# Patient Record
Sex: FEMALE | Race: WHITE | Employment: OTHER | URBAN - METROPOLITAN AREA
[De-identification: names, ages, dates, MRNs, and addresses within clinical notes are randomized per-mention and may not be internally consistent; named-entity substitution may affect disease eponyms.]

---

## 2020-02-12 ENCOUNTER — HOSPITAL ENCOUNTER (EMERGENCY)
Facility: CLINIC | Age: 75
Discharge: HOME | End: 2020-02-12
Attending: EMERGENCY MEDICINE | Admitting: EMERGENCY MEDICINE
Payer: COMMERCIAL

## 2020-02-12 VITALS
RESPIRATION RATE: 16 BRPM | OXYGEN SATURATION: 96 % | SYSTOLIC BLOOD PRESSURE: 153 MMHG | HEIGHT: 67 IN | DIASTOLIC BLOOD PRESSURE: 74 MMHG | WEIGHT: 180 LBS | TEMPERATURE: 97.6 F | BODY MASS INDEX: 28.25 KG/M2

## 2020-02-12 DIAGNOSIS — R55 PRE-SYNCOPE: Primary | ICD-10-CM

## 2020-02-12 DIAGNOSIS — R42 DIZZINESS: ICD-10-CM

## 2020-02-12 DIAGNOSIS — R42 LIGHTHEADEDNESS: ICD-10-CM

## 2020-02-12 LAB
ALBUMIN UR-MCNC: NEGATIVE MG/DL
ANION GAP SERPL CALCULATED.3IONS-SCNC: 4 MMOL/L (ref 3–14)
APPEARANCE UR: CLEAR
BACTERIA #/AREA URNS HPF: ABNORMAL /HPF
BASOPHILS # BLD AUTO: 0 10E9/L (ref 0–0.2)
BASOPHILS NFR BLD AUTO: 0.2 %
BILIRUB UR QL STRIP: NEGATIVE
BUN SERPL-MCNC: 16 MG/DL (ref 7–30)
CALCIUM SERPL-MCNC: 9.9 MG/DL (ref 8.5–10.1)
CHLORIDE SERPL-SCNC: 108 MMOL/L (ref 94–109)
CO2 SERPL-SCNC: 28 MMOL/L (ref 20–32)
COLOR UR AUTO: ABNORMAL
CREAT SERPL-MCNC: 0.83 MG/DL (ref 0.52–1.04)
DIFFERENTIAL METHOD BLD: ABNORMAL
EOSINOPHIL # BLD AUTO: 0.1 10E9/L (ref 0–0.7)
EOSINOPHIL NFR BLD AUTO: 0.8 %
ERYTHROCYTE [DISTWIDTH] IN BLOOD BY AUTOMATED COUNT: 12.7 % (ref 10–15)
GFR SERPL CREATININE-BSD FRML MDRD: 70 ML/MIN/{1.73_M2}
GLUCOSE SERPL-MCNC: 116 MG/DL (ref 70–99)
GLUCOSE UR STRIP-MCNC: NEGATIVE MG/DL
HCT VFR BLD AUTO: 47.9 % (ref 35–47)
HGB BLD-MCNC: 16.1 G/DL (ref 11.7–15.7)
HGB UR QL STRIP: NEGATIVE
IMM GRANULOCYTES # BLD: 0 10E9/L (ref 0–0.4)
IMM GRANULOCYTES NFR BLD: 0.2 %
INTERPRETATION ECG - MUSE: NORMAL
KETONES UR STRIP-MCNC: NEGATIVE MG/DL
LEUKOCYTE ESTERASE UR QL STRIP: NEGATIVE
LYMPHOCYTES # BLD AUTO: 1.6 10E9/L (ref 0.8–5.3)
LYMPHOCYTES NFR BLD AUTO: 18.6 %
MCH RBC QN AUTO: 30.4 PG (ref 26.5–33)
MCHC RBC AUTO-ENTMCNC: 33.6 G/DL (ref 31.5–36.5)
MCV RBC AUTO: 91 FL (ref 78–100)
MONOCYTES # BLD AUTO: 0.6 10E9/L (ref 0–1.3)
MONOCYTES NFR BLD AUTO: 6.4 %
NEUTROPHILS # BLD AUTO: 6.3 10E9/L (ref 1.6–8.3)
NEUTROPHILS NFR BLD AUTO: 73.8 %
NITRATE UR QL: NEGATIVE
PH UR STRIP: 5.5 PH (ref 5–7)
PLATELET # BLD AUTO: 230 10E9/L (ref 150–450)
POTASSIUM SERPL-SCNC: 3.9 MMOL/L (ref 3.4–5.3)
RBC # BLD AUTO: 5.29 10E12/L (ref 3.8–5.2)
RBC #/AREA URNS AUTO: <1 /HPF (ref 0–2)
SODIUM SERPL-SCNC: 140 MMOL/L (ref 133–144)
SOURCE: ABNORMAL
SP GR UR STRIP: 1 (ref 1–1.03)
SQUAMOUS #/AREA URNS AUTO: <1 /HPF (ref 0–1)
TROPONIN I SERPL-MCNC: 0.02 UG/L (ref 0–0.04)
TSH SERPL DL<=0.005 MIU/L-ACNC: 0.98 MU/L (ref 0.4–4)
UROBILINOGEN UR STRIP-MCNC: NORMAL MG/DL (ref 0–2)
WBC # BLD AUTO: 8.6 10E9/L (ref 4–11)
WBC #/AREA URNS AUTO: 2 /HPF (ref 0–5)

## 2020-02-12 PROCEDURE — 99284 EMERGENCY DEPT VISIT MOD MDM: CPT | Mod: 25

## 2020-02-12 PROCEDURE — 25800030 ZZH RX IP 258 OP 636: Performed by: EMERGENCY MEDICINE

## 2020-02-12 PROCEDURE — 93005 ELECTROCARDIOGRAM TRACING: CPT

## 2020-02-12 PROCEDURE — 84443 ASSAY THYROID STIM HORMONE: CPT | Performed by: EMERGENCY MEDICINE

## 2020-02-12 PROCEDURE — 96360 HYDRATION IV INFUSION INIT: CPT

## 2020-02-12 PROCEDURE — 85025 COMPLETE CBC W/AUTO DIFF WBC: CPT | Performed by: EMERGENCY MEDICINE

## 2020-02-12 PROCEDURE — 80048 BASIC METABOLIC PNL TOTAL CA: CPT | Performed by: EMERGENCY MEDICINE

## 2020-02-12 PROCEDURE — 84484 ASSAY OF TROPONIN QUANT: CPT | Performed by: EMERGENCY MEDICINE

## 2020-02-12 PROCEDURE — 81001 URINALYSIS AUTO W/SCOPE: CPT | Performed by: EMERGENCY MEDICINE

## 2020-02-12 RX ADMIN — SODIUM CHLORIDE 1000 ML: 9 INJECTION, SOLUTION INTRAVENOUS at 16:49

## 2020-02-12 ASSESSMENT — ENCOUNTER SYMPTOMS
NUMBNESS: 0
VOMITING: 1
DIZZINESS: 0
DYSURIA: 1
FREQUENCY: 1
LIGHT-HEADEDNESS: 1
WEAKNESS: 0
DIAPHORESIS: 1
BLOOD IN STOOL: 0
SHORTNESS OF BREATH: 0

## 2020-02-12 ASSESSMENT — MIFFLIN-ST. JEOR: SCORE: 1354.1

## 2020-02-12 NOTE — ED PROVIDER NOTES
History     Chief Complaint:    Dizziness    The history is provided by the patient.      Judi Belcher is a 74 year old female with a history of hyperlipidemia and sepsis who presents with dizziness. Today the patient was shopping at [employer] when she became diaphoretic and dizzy. The patient needed to sit down and texted a family member for help. She did not experience chest pain or shortness of breath before her symptoms began. She reports feeling faint and lightheaded, but did not lose consciousness and does not feel like the room is spinning. She also endorses dysuria and increased urinary frequency that began today. She had 1 episode of emesis while in the ED. Currently she denies dizziness. The patient denies bloody stools, vision changes, or extremity numbness or weakness. This has never happened to her before. The patient does not have a personal history of strokes, myocardial infarctions, or cardiac stent placements.     Allergies:  No known drug allergies    Medications:    Simvastatin  Aspirin  Calcium carbonate    Past Medical History:    Hyperlipidemia  Sepsis due to group A streptococcus  GRETA  Allergic rhinitis  Tubular adenoma  Complex endometrial hyperplasia    Past Surgical History:    Hysterectomy  Bilateral cataract removal    Family History:    Heart disease - mother, father    Social History:  Smoking status: never smoker  Alcohol use: yes  Drug use: no  The patient presents to the emergency department with her nephew's wife.  PCP: [facility]  Marital Status:       Review of Systems   Constitutional: Positive for diaphoresis.   Eyes: Negative for visual disturbance.   Respiratory: Negative for shortness of breath.    Cardiovascular: Negative for chest pain.   Gastrointestinal: Positive for vomiting. Negative for blood in stool.   Genitourinary: Positive for dysuria and frequency.   Neurological: Positive for light-headedness. Negative for dizziness (earlier,  "none currently), syncope, weakness and numbness.   All other systems reviewed and are negative.      Physical Exam     Patient Vitals for the past 24 hrs:   BP Temp Heart Rate Resp SpO2 Height Weight   02/12/20 1434 (!) 153/74 97.6  F (36.4  C) 64 16 96 % 1.702 m (5' 7\") 81.6 kg (180 lb)     Physical Exam  General: Alert, appears well-developed and well-nourished. Cooperative.     In no acute distress  HEENT:  Head:  Atraumatic  Ears:  External ears are normal  Mouth/Throat:  Oropharynx is without erythema or exudate and mucous membranes are moist.   Eyes:   Conjunctivae normal and EOM are normal. No scleral icterus.    Pupils are equal, round, and reactive to light.   CV:  Normal rate, regular rhythm, normal heart sounds and radial pulses are 2+ and symmetric.  No murmur.  Resp:  Breath sounds are clear bilaterally    Non-labored, no retractions or accessory muscle use  GI:  Abdomen is soft, no distension, no tenderness. No rebound or guarding.  No CVA tenderness bilaterally  MS:  Normal range of motion. No edema.    Normal strength in all 4 extremities.     Back atraumatic.    No midline cervical, thoracic, or lumbar tenderness  Skin:  Warm and dry.  No rash or lesions noted.  Neuro: Alert. Normal strength.  Sensation intact in all 4 extremities. GCS: 15  Psych:  Normal mood and affect.    Emergency Department Course     ECG (14:44:11):  Rate 66 bpm. PA interval 168. QRS duration 98. QT/QTc 404/423. P-R-T axes 58 -57 80. Sinus rhythm with premature atrial complexes in a pattern of bigeminy. Left axis deviation. Cannot rule out Anterior infarct, age undetermined. Abnormal ECG. No significant change compared to EKG dated 9/28/16. Interpreted at 1748 by Lew Osorio MD.    Laboratory:  Laboratory findings were communicated with the patient and family who voiced understanding of the findings.    CBC: HGB 16.1 H o/w WNL (WBC 8.6, )  BMP: Glucose 116 H o/w WNL (Creatinine 0.83)  Troponin I (1738): 0.024  TSH " with free T4 reflex: 0.98    UA: clear, light yellow urine, bacteria few o/w negative    Interventions:  1649: 0.9% sodium chloride BOLUS 1 L IV    Emergency Department Course:  Past medical records, nursing notes, and vitals reviewed.    1639: I performed an exam of the patient as documented above.     EKG obtained in the ED, see results above.     IV was inserted and blood was drawn for laboratory testing, results above.    The patient provided a urine sample here in the emergency department. This was sent for laboratory testing, findings above.    1749: I rechecked the patient and discussed the results of her workup thus far.     I personally reviewed the laboratory and EKG results with the Patient and nephew's wife and answered all related questions prior to discharge.     Findings and plan explained to the Patient and nephew's wife. Patient discharged home with instructions regarding supportive care, medications, and reasons to return. The importance of close follow-up was reviewed.    Impression & Plan     Medical Decision Making:  Judi Belcher is a 74 year old female who presents for evaluation of near-syncope and transient dizziness.  They definitely did not have actual syncope.  The differential for near-syncope is broad and includes etiologies such as cardiac arrhythmia, ACS, aortic stenosis, HOCM, PE, orthostatic hypotension, drugs, situational, carotid hypersensitivity, seizure, TIA, stroke, vasovagal.  There are no signs of a concerning etiology for near- syncope at this point.  In addition, there is no family history of sudden death, no chest pain, no seizure activity or post-ictal period, no murmur, and no signs of orthostasis in the ED, no focal neurologic symptoms, and no complaints of concerning headache.  She is not dizzy here in the ED and it seems this lightheadedness/dizziness was transient and unlikely related to CVA, vertigo or other common forms of dizzy spells.  The workup in the ED is  negative and the physical exam is re-assuring.  Supportive outpatient management is therefore indicated.    Diagnosis:    ICD-10-CM   1. Pre-syncope R55   2. Dizziness R42   3. Lightheadedness R42     Disposition:  Discharged to home.    Discharge Medications:  Discharge Medication List as of 2/12/2020  5:52 PM        Scribe Disclosure:  I, Kenna Rubio, am serving as a scribe at 4:42 PM on 2/12/2020 to document services personally performed by Lew Osorio MD based on my observations and the provider's statements to me.     Kenna Rubio  2/12/2020    EMERGENCY DEPARTMENT       eLw Osorio MD  02/13/20 112

## 2020-02-12 NOTE — ED AVS SNAPSHOT
Emergency Department  [address] [facility] [address]  Phone:  [phone]  Fax:  [phone]                                    Judi Belcher   MRN: [id number]    Department:   Emergency Department   Date of Visit:  2/12/2020           After Visit Summary Signature Page    I have received my discharge instructions, and my questions have been answered. I have discussed any challenges I see with this plan with the nurse or doctor.    ..........................................................................................................................................  Patient/Patient Representative Signature      ..........................................................................................................................................  Patient Representative Print Name and Relationship to Patient    ..................................................               ................................................  Date                                   Time    ..........................................................................................................................................  Reviewed by Signature/Title    ...................................................              ..............................................  Date                                               Time          22EPIC Rev 08/18